# Patient Record
(demographics unavailable — no encounter records)

---

## 2024-11-20 NOTE — HISTORY OF PRESENT ILLNESS
[de-identified] : 33 y/o male with pmhx of hld, prediabetes, presents for follow up.  Patient has been working on dietary and lifestyle modification for the management of his HLD and prediabetes.  He otherwise is doing well.

## 2024-11-20 NOTE — ASSESSMENT
[FreeTextEntry1] : HLD Will repeat lipid panel today Continue to work on dietary changes for the management of cholesterol  Prediabetes Will repeat hemoglobin A1c today